# Patient Record
Sex: FEMALE | URBAN - METROPOLITAN AREA
[De-identification: names, ages, dates, MRNs, and addresses within clinical notes are randomized per-mention and may not be internally consistent; named-entity substitution may affect disease eponyms.]

---

## 2023-07-27 ENCOUNTER — NURSE TRIAGE (OUTPATIENT)
Dept: NURSING | Facility: CLINIC | Age: 48
End: 2023-07-27

## 2023-07-28 NOTE — TELEPHONE ENCOUNTER
Patient has a colonoscopy in the AM. She now has a migraine and is wondering if she can take her migraine medication. Patient read off instructions she was given for pre-procedure. Per those instructions, advised that she would be okay to take the medication now. No additional questions.     Mike Lara RN on 7/27/2023 at 7:25 PM    Reason for Disposition    Question about upcoming scheduled test, no triage required and triager able to answer question    Protocols used: Information Only Call - No Triage-A-